# Patient Record
Sex: FEMALE | Race: WHITE | Employment: FULL TIME | URBAN - METROPOLITAN AREA
[De-identification: names, ages, dates, MRNs, and addresses within clinical notes are randomized per-mention and may not be internally consistent; named-entity substitution may affect disease eponyms.]

---

## 2018-11-05 ENCOUNTER — OFFICE VISIT (OUTPATIENT)
Dept: FAMILY MEDICINE | Facility: CLINIC | Age: 18
End: 2018-11-05
Payer: COMMERCIAL

## 2018-11-05 VITALS
BODY MASS INDEX: 42.68 KG/M2 | WEIGHT: 265.6 LBS | TEMPERATURE: 98.2 F | SYSTOLIC BLOOD PRESSURE: 106 MMHG | OXYGEN SATURATION: 97 % | HEIGHT: 66 IN | HEART RATE: 85 BPM | DIASTOLIC BLOOD PRESSURE: 76 MMHG

## 2018-11-05 DIAGNOSIS — Z23 ENCOUNTER FOR IMMUNIZATION: ICD-10-CM

## 2018-11-05 DIAGNOSIS — Z00.00 ROUTINE GENERAL MEDICAL EXAMINATION AT A HEALTH CARE FACILITY: Primary | ICD-10-CM

## 2018-11-05 DIAGNOSIS — F41.9 ANXIETY: ICD-10-CM

## 2018-11-05 DIAGNOSIS — N91.2 AMENORRHEA: ICD-10-CM

## 2018-11-05 DIAGNOSIS — E28.2 PCOS (POLYCYSTIC OVARIAN SYNDROME): ICD-10-CM

## 2018-11-05 LAB
CHOLEST SERPL-MCNC: 153.4 MG/DL (ref 0–200)
CHOLEST/HDLC SERPL: 4.1 {RATIO} (ref 0–5)
FSH SERPL-ACNC: 2 IU/L
HBA1C MFR BLD: 5.3 % (ref 4.1–5.7)
HCG UR QL: NEGATIVE
HDLC SERPL-MCNC: 37.5 MG/DL
LDLC SERPL CALC-MCNC: 76 MG/DL (ref 0–129)
LH SERPL-ACNC: 3.2 IU/L
PROLACTIN SERPL-MCNC: 11 UG/L (ref 3–27)
TRIGL SERPL-MCNC: 200.9 MG/DL (ref 0–150)
TSH SERPL DL<=0.005 MIU/L-ACNC: 1.99 MU/L (ref 0.4–4)
VLDL CHOLESTEROL: 40.2 MG/DL (ref 7–32)

## 2018-11-05 RX ORDER — LEVONORGESTREL AND ETHINYL ESTRADIOL 0.15-0.03
1 KIT ORAL DAILY
COMMUNITY
End: 2019-04-30

## 2018-11-05 RX ORDER — ERGOCALCIFEROL 1.25 MG/1
50000 CAPSULE, LIQUID FILLED ORAL
COMMUNITY
End: 2018-11-11

## 2018-11-05 RX ORDER — AZITHROMYCIN 250 MG/1
250 TABLET, FILM COATED ORAL EVERY OTHER DAY
Qty: 6 TABLET | COMMUNITY
Start: 2018-11-05

## 2018-11-05 RX ORDER — BUSPIRONE HYDROCHLORIDE 5 MG/1
10 TABLET ORAL 2 TIMES DAILY
Qty: 90 TABLET | COMMUNITY
Start: 2018-11-05 | End: 2018-11-20

## 2018-11-05 NOTE — PATIENT INSTRUCTIONS
Here is the plan from today's visit    1. Routine general medical examination at a health care facility  - HIV Antigen Antibody Combo  - Hepatitis A Antibody IgG  - Hepatitis A antibody IgM  - Hepatitis B Surface Antibody  - Hepatitis B surface antigen  - Hepatitis C antibody  - M Tuberculosis by Quantiferon  - HCG Qualitative Urine ([facility])  - NEISSERIA GONORRHOEA PCR  - CHLAMYDIA TRACHOMATIS PCR  - TSH with free T4 reflex  - Prolactin  - Testosterone Free and Total  - Follicle stimulating hormone  - Lutropin  - Lipid Cascade ([facility])  - Hemoglobin A1c ([facility])    2. Amenorrhea  - TSH with free T4 reflex  - Prolactin  - Testosterone Free and Total  - Follicle stimulating hormone  - Lutropin  - Lipid Cascade ([facility])  - Hemoglobin A1c ([facility])    Please call or return to clinic if your symptoms don't go away.    Follow up plan  Please make a clinic appointment for follow up with me (MARILOU TAVAREZ) in 2 weeks to discuss labs.      Thank you for coming to [facility] today.  Lab Testing:  **If you had lab testing today and your results are reassuring or normal they will be mailed to you or sent through [employer] within 7 days.   **If the lab tests need quick action we will call you with the results.  The phone number we will call with results is # [phone] (home) . If this is not the best number please call our clinic and change the number.  Medication Refills:  If you need any refills please call your pharmacy and they will contact us.   If you need to  your refill at a new pharmacy, please contact the new pharmacy directly. The new pharmacy will help you get your medications transferred faster.   Scheduling:  If you have any concerns about today's visit or wish to schedule another appointment please call our office during normal business hours [phone] (8-5:00 M-F)  If a referral was made to a [facility] and you don't get a call from central scheduling please  call [phone].  If a Mammogram was ordered for you at [facility] call [phone] to schedule or change your appointment.  If you had an XRay/CT/Ultrasound/MRI ordered the number is [phone] to schedule or change your radiology appointment.   Medical Concerns:  If you have urgent medical concerns please call [phone] at any time of the day.    Shila Frankel MD    Preventive Health Recommendations  Female Ages 18 to 20     Yearly exam:     See your health care provider every year in order to  o Review health changes.   o Discuss preventive care.    o Review your medicines if your doctor has prescribed any.      You should be tested each year for STDs (sexually transmitted diseases).       After age 20, talk to your provider about how often you should have cholesterol testing.      If you are at risk for diabetes, you should have a diabetes test (fasting glucose).     Shots:     Get a flu shot each year.     Get a tetanus shot every 10 years.     Consider getting the shot (vaccine) that prevents cervical cancer (Gardasil).    Nutrition:     Eat at least 5 servings of fruits and vegetables each day.    Eat whole-grain bread, whole-wheat pasta and brown rice instead of white grains and rice.    Get adequate Calcium and Vitamin D.     Lifestyle    Exercise at least 150 minutes a week each week (30 minutes a day, 5 days a week). This will help you control your weight and prevent disease.    No smoking.     Wear sunscreen to prevent skin cancer.    See your dentist every six months for an exam and cleaning.

## 2018-11-05 NOTE — PROGRESS NOTES
Preceptor Attestation:   Patient seen, evaluated and discussed with the resident. I have verified the content of the note, which accurately reflects my assessment of the patient and the plan of care.   Supervising Physician:  Luis F Lion MD

## 2018-11-05 NOTE — PROGRESS NOTES
"    Female Physical Note          HPI       Zeinab Terrazas is a 19 y/o female with a history of PTSD, anxiety/depression, unknown Ab immunodeficiency (follows at [facility]) presenting as a referral from [facility] in [place] for concern for PCOS, as well as other concerns today.    1. PCOS referral - Patient follows with [facility] in [place]. History of irregular menses. Previously was on OCPs for cramping and heavy periods (one super tampon an hour), but then taken off OCPs for approximately one year. No history of anemia. Her LMP was November 2017. She recently saw her provider at [facility] who did blood work and was found to have elevated testosterone, was started on OCPs one week ago, and referred here for further evaluation of PCOS. She does have a history of hirsutism, acne, and recent 10 lb weight gain. Patient is not currently sexually active. No history of STDs or pregnancy.     2. Anxiety/Depression/PTSD  Currently follows at [facility] for this. She does have a history of manic-like episodes, positive family history of BD. Has not been on Lithium in the past. Recently, anxiety and depression have worsened. She also notes two panic attacks in the last two months. No suicidal ideation.    3. Health maintenance  Zeinab is interested in flu shot today as well as STD screening.     Medical History:  PTSD  Anxiety/Depression  Specific Ab immunodeficiency (unknown)  Irregular menses    Previous Medical Care   [facility] in [facility]   - DURAN signed for previous medical care    Family History:   Mom - Bipolar disorder, \"thyroid issues\"  Grandpa - Bipolar disorder         Review of Systems:     Review of Systems:  CONSTITUTIONAL: NEGATIVE for fever, chills; +weight gain 10 lbs  INTEGUMENTARY/SKIN: NEGATIVE for worrisome rashes, moles or lesions  + acne  EYES: NEGATIVE for vision changes or irritation  ENT/MOUTH: NEGATIVE for ear, mouth and throat problems  RESP: NEGATIVE for significant " "cough or SOB  BREAST: NEGATIVE for masses, tenderness or discharge  CV: NEGATIVE for chest pain, palpitations or peripheral edema  GI: NEGATIVE for nausea, abdominal pain, heartburn, or change in bowel habits  : NEGATIVE for frequency, dysuria, or hematuria  MUSCULOSKELETAL: NEGATIVE for significant arthralgias or myalgia  NEURO: NEGATIVE for weakness, dizziness  ENDOCRINE: NEGATIVE for temperature intolerance; +hair growth on chin and patch on back  HEME/ALLERGY: NEGATIVE for bleeding problems, +immune problem  PSYCHIATRIC: +anxiety, depression    Sleep:   Do you snore most or the night (as reported by a family member)? No  Do you feel sleepy or extremely tired during most of the day? No           Social History     Social History: Currently at [employer]. History of meth use, alcohol use. Prior smoker. No current alcohol or illicit drug use.     Marital Status: Single    Has anyone hurt you physically, for example by pushing, hitting, slapping or kicking you or forcing you to have sex? Denies  Do you feel threatened or controlled by a partner, ex-partner or anyone in your life? Denies    Sexual Health     Sexual concerns: Yes - Would like STD testing today- only desire GC/clamydia   STI History: Neg  Pregnancy History: No previous pregnancies.  LMP: November 2017.  Last Pap Smear Date: NA  Abnormal Pap History: NA    Recommended Screening     Cholesterol Level (>44 yo or at risk):  Recommended and patient accepted testing., HIV screening, Hepatitis, GC/clamydia testing:  Recommended and patient accepted.  Hgb A1C testing recommended and accepted.         Physical Exam:     Vitals: /76  Pulse 85  Temp 98.2  F (36.8  C) (Oral)  Ht 5' 6.25\" (168.3 cm)  Wt 265 lb 9.6 oz (120.5 kg)  SpO2 97%  BMI 42.55 kg/m2  BMI= Body mass index is 42.55 kg/(m^2).   GENERAL: Well nourished, pleasant, NAD  EYES: Eyes grossly normal to inspection, extraocular movements - intact, and PERRL  HENT: ear " canals- normal; TMs- normal; Nose- normal; Mouth- no ulcers, no lesions  NECK: no tenderness, no adenopathy, no asymmetry, no masses, no stiffness; thyroid- normal to palpation  RESP: lungs clear to auscultation - no rales, no rhonchi, no wheezes  CV: regular rates and rhythm, normal S1 S2, no S3 or S4 and no murmur, no click or rub   ABDOMEN: soft, no tenderness, no  hepatosplenomegaly, no masses, normal bowel sounds  MS: extremities- no gross deformities noted, no edema  SKIN: no suspicious lesions, small patch of hair in mid back  NEURO: strength and tone- normal, sensory exam- grossly normal, mentation- intact, speech- normal, reflexes- symmetric  BACK: no CVA tenderness  - female: cervix- normal, adnexae- normal; uterus- normal, no masses, no discharge  PSYCH: Alert and oriented times 3; speech- coherent , normal rate and volume; able to articulate logical thoughts, able to abstract reason, no tangential thoughts, no hallucinations or delusions, affect- normal, no suicidal ideation  LYMPHATICS: ant. cervical- normal, post. cervical- normal    Assessment and Plan     Zeinab Terrazas is a 17 y/o female with a history of PTSD, anxiety/depression, specific Ab immunodeficiency (follows at [facility]) presenting as a referral from [facility] [place] for concern for PCOS, as well as health maintenance exam.    # Amenorrhea  # PCOS  Based on history and exam consistent with hirsutism and amenorrhea with history of elevated testosterone, patient clinically meets diagnosis for PCOS. We will recheck labs here and do full amenorrhea workup. She is already on OCPs. Follow up in 2 weeks to discuss lab results. Depending on what the labs show, we will consider obtaining pelvic US and in the future adding metformin if no improvement with OCP's in 6 months. If symptoms of hirsutism and acne are bothersome, could also consider adding spironolactone.   - Continue OCP's   - Recommended weight loss with daily  exercise and healthy eating habits    Labs  -     TSH with free T4 reflex  -     Prolactin  -     Testosterone Free and Total  -     Follicle stimulating hormone  -     Lutropin  -     Lipid Cascade ([facility])  -     Hemoglobin A1c ([facility])    # Anxiety  # Depression  # PTSD  Follows with [facility] and has appt tomorrow.   - Follow up with [facility] for medication management    # Routine general medical examination at a health care facility  Concern for STDs today.  Pelvic exam normal, no tenderness or masses.   -     NEISSERIA GONORRHOEA PCR  -     CHLAMYDIA TRACHOMATIS PCR  -     HIV Antigen Antibody Combo  -     Hepatitis A Antibody IgG  -     Hepatitis A antibody IgM  -     Hepatitis B Surface Antibody  -     Hepatitis B surface antigen  -     Hepatitis C antibody  -     M Tuberculosis by Quantiferon  -     HCG Qualitative Urine ([facility])    # Encounter for immunization  -     ADMIN VACCINE, INITIAL  -     FLU VAC PRESRV FREE QUAD SPLIT VIR IM, 0.5 mL dosage    Follow up in 2 weeks to go over laboratory testing.    Options for treatment and follow-up care were reviewed with the patient . Zeinab Terrazas and/or guardian engaged in the decision making process and verbalized understanding of the options discussed and agreed with the final plan.    Patient seen and examined with Dr. Frankel.  Soco Brennan, MS4    I was present with the medical student who participated in the service and in the documentation of this note. I have verified the history and personally performed the physical exam and medical decision making, and have verified the content of the note, which accurately reflects my assessment of the patient and the plan of care.   Shila Frankel, DO

## 2018-11-05 NOTE — LETTER
November 8, 2018      Zeinab Terrazas  [address] [place] [address]        Dear Zeinab,    Thank you for getting your care at [facility]. Please see below for your test results. Your results are normal except a reactive hepatitis A antibody IgG and negative IgM antibody that shows a previous hepatitis A infection with no current infection. Your triglycerides were slightly elevated and your HDL was slightly low. Please follow up in 2 weeks to discuss further.     Resulted Orders   HIV Antigen Antibody Combo   Result Value Ref Range    HIV Antigen Antibody Combo Nonreactive NR^Nonreactive          Comment:      HIV-1 p24 Ag & HIV-1/HIV-2 Ab Not Detected   Hepatitis A Antibody IgG   Result Value Ref Range    Hepatitis A Antibody IgG Reactive (AA) NR^Nonreactive      Comment:      This assay cannot be used for the diagnosis of acute HAV infection.   Hepatitis A antibody IgM   Result Value Ref Range    Hepatitis A IgM [place] Nonreactive NR^Nonreactive      Comment:      IgM anti-HAV not detected. Does not exclude the possibility of recent exposure   to or infection with HAV.     Hepatitis B Surface Antibody   Result Value Ref Range    Hepatitis B Surface Antibody 5.12 <8.00 m[IU]/mL      Comment:      Nonreactive, No antibody detected when the value is less than 8.00 m[IU]/mL.   Hepatitis B surface antigen   Result Value Ref Range    Hep B Surface Agn Nonreactive NR^Nonreactive   Hepatitis C antibody   Result Value Ref Range    Hepatitis C Antibody Nonreactive NR^Nonreactive      Comment:      Assay performance characteristics have not been established for newborns,   infants, and children     HCG Qualitative Urine (Lab[facility])   Result Value Ref Range    HCG Qual Urine NEGATIVE Negative   NEISSERIA GONORRHOEA PCR   Result Value Ref Range    Specimen Descrip Cervical     N Gonorrhea PCR Negative NEG^Negative      Comment:      Negative for N. gonorrhoeae rRNA by transcription mediated amplification.  A negative  result by transcription mediated amplification does not preclude   the presence of N. gonorrhoeae infection because results are dependent on   proper and adequate collection, absence of inhibitors, and sufficient rRNA to   be detected.     CHLAMYDIA TRACHOMATIS PCR   Result Value Ref Range    Specimen Description Cervical     Chlamydia Trachomatis PCR Negative NEG^Negative      Comment:      Negative for C. trachomatis rRNA by transcription mediated amplification.  A negative result by transcription mediated amplification does not preclude   the presence of C. trachomatis infection because results are dependent on   proper and adequate collection, absence of inhibitors, and sufficient rRNA to   be detected.     TSH with free T4 reflex   Result Value Ref Range    TSH 1.99 0.40 - 4.00 mU/L   Prolactin   Result Value Ref Range    Prolactin 11 3 - 27 ug/L      Comment:      Reference ranges apply to non-pregnant females only.   Testosterone Free and Total   Result Value Ref Range    Testosterone Total 51 8 - 60 ng/dL      Comment:      This test was developed and its performance characteristics determined by the   [facility],  [facility]. It has   not been cleared or approved by the FDA. The laboratory is regulated under   CLIA as qualified to perform high-complexity testing. This test is used for   clinical purposes. It should not be regarded as investigational or for   research.      Sex Hormone Binding Globulin 64 30 - 135 nmol/L    Free Testosterone Calculated 0.58 0.08 - 0.74 ng/dL      Comment:      18-30 Years 0.08-0.74 ng/dL  31-40 Years 0.13-0.92 ng/dL  41-51 Years 0.11-0.58 ng/dL  Postmenopausal 0.06-0.38 ng/dL     Follicle stimulating hormone   Result Value Ref Range    FSH 2.0 IU/L      Comment:      FSH Reference Range  Female: Follicular      2.5-10.2          Mid-cycle       3.4-33.4          Luteal          1.5-9.1          Postmenopausal  23.0-116.3      Lutropin   Result Value Ref Range    Lutropin 3.2 IU/L      Comment:      LH Reference Range  Female: Follicular      1.9-12.5          Mid-cycle       8.7-76.3          Luteal          0.5-16.9          Postmenopausal  15.9-54.0     Lipid Cascade ([facility])   Result Value Ref Range    Cholesterol 153.4 0.0 - 200.0 mg/dL    Cholesterol/HDL Ratio 4.1 0.0 - 5.0    HDL Cholesterol 37.5 (L) >40.0 mg/dL    LDL Cholesterol Calculated 76 0 - 129 mg/dL    Triglycerides 200.9 (H) 0.0 - 150.0 mg/dL    VLDL Cholesterol 40.2 (H) 7.0 - 32.0 mg/dL   Hemoglobin A1c ([facility])   Result Value Ref Range    Hemoglobin A1C 5.3 4.1 - 5.7 %   Quantiferon TB Gold Plus   Result Value Ref Range    Quantiferon-TB Gold Plus Result Negative NEG^Negative      Comment:      No interferon gamma response to M.tuberculosis antigens was detected.   Infection with M.tuberculosis is unlikely, however a single negative result   does not exclude infection. In patients at high risk for infection, a second   test should be considered  in accordance with the 2017 ATS/IDSA/CDC Clinical Practice Guidelines for   Diagnosis of Tuberculosis in Adults and Children [Lewinsohn DM et   al.Clin.Infect.Dis. 2017 64(2):111-115].      TB1 Ag minus Nil Value 0.00 IU/mL    TB2 Ag minus Nil Value 0.00 IU/mL    Mitogen minus Nil Result >10.00 IU/mL    Nil Result 0.01 IU/mL       As we discussed at our last visit, please follow up with a clinic appointment to review these results further.    Sincerely,    Shila Frankel MD

## 2018-11-05 NOTE — MR AVS SNAPSHOT
After Visit Summary   11/5/2018    Zeinab Terrazas    MRN: [id number]           Patient Information     Date Of Birth          2000        Visit Information        Provider Department      11/5/2018 1:20 PM Marilou Tavarez MD [facility]        Today's Diagnoses     Routine general medical examination at a health care facility    -  1    Amenorrhea          Care Instructions    Here is the plan from today's visit    1. Routine general medical examination at a health care facility  - HIV Antigen Antibody Combo  - Hepatitis A Antibody IgG  - Hepatitis A antibody IgM  - Hepatitis B Surface Antibody  - Hepatitis B surface antigen  - Hepatitis C antibody  - M Tuberculosis by Quantiferon  - HCG Qualitative Urine (LabDAQ)  - NEISSERIA GONORRHOEA PCR  - CHLAMYDIA TRACHOMATIS PCR  - TSH with free T4 reflex  - Prolactin  - Testosterone Free and Total  - Follicle stimulating hormone  - Lutropin  - Lipid Cascade ([facility])  - Hemoglobin A1c ([facility])    2. Amenorrhea  - TSH with free T4 reflex  - Prolactin  - Testosterone Free and Total  - Follicle stimulating hormone  - Lutropin  - Lipid Cascade ([facility])  - Hemoglobin A1c ([facility])    Please call or return to clinic if your symptoms don't go away.    Follow up plan  Please make a clinic appointment for follow up with me (MARILOU TAVAREZ) in 2 weeks to discuss labs.      Thank you for coming to [facility] today.  Lab Testing:  **If you had lab testing today and your results are reassuring or normal they will be mailed to you or sent through [employer] within 7 days.   **If the lab tests need quick action we will call you with the results.  The phone number we will call with results is # [phone] (home) . If this is not the best number please call our clinic and change the number.  Medication Refills:  If you need any refills please call your pharmacy and they will contact us.   If you need to  your refill at a  new pharmacy, please contact the new pharmacy directly. The new pharmacy will help you get your medications transferred faster.   Scheduling:  If you have any concerns about today's visit or wish to schedule another appointment please call our office during normal business hours [phone] (8-5:00 M-F)  If a referral was made to a [facility] and you don't get a call from central scheduling please call [phone].  If a Mammogram was ordered for you at [facility] call [phone] to schedule or change your appointment.  If you had an XRay/CT/Ultrasound/MRI ordered the number is [phone] to schedule or change your radiology appointment.   Medical Concerns:  If you have urgent medical concerns please call [phone] at any time of the day.    Shila Frankel MD    Preventive Health Recommendations  Female Ages 18 to 20     Yearly exam:     See your health care provider every year in order to  o Review health changes.   o Discuss preventive care.    o Review your medicines if your doctor has prescribed any.      You should be tested each year for STDs (sexually transmitted diseases).       After age 20, talk to your provider about how often you should have cholesterol testing.      If you are at risk for diabetes, you should have a diabetes test (fasting glucose).     Shots:     Get a flu shot each year.     Get a tetanus shot every 10 years.     Consider getting the shot (vaccine) that prevents cervical cancer (Gardasil).    Nutrition:     Eat at least 5 servings of fruits and vegetables each day.    Eat whole-grain bread, whole-wheat pasta and brown rice instead of white grains and rice.    Get adequate Calcium and Vitamin D.     Lifestyle    Exercise at least 150 minutes a week each week (30 minutes a day, 5 days a week). This will help you control your weight and prevent disease.    No smoking.     Wear sunscreen to prevent skin cancer.    See your dentist every six months  for an exam and cleaning.                Follow-ups after your visit        Who to contact     Please call your clinic at [phone] to:    Ask questions about your health    Make or cancel appointments    Discuss your medicines    Learn about your test results    Speak to your doctor            Additional Information About Your Visit        Care EveryWhere ID     This is your Care EveryWhere ID. This could be used by other organizations to access your [facility] medical records  [id number]        Your Vitals Were     Pulse Temperature Pulse Oximetry             85 98.2  F (36.8  C) (Oral) 97%          Blood Pressure from Last 3 Encounters:   11/05/18 106/76    Weight from Last 3 Encounters:   11/05/18 265 lb 9.6 oz (120.5 kg)              We Performed the Following     CHLAMYDIA TRACHOMATIS PCR     Follicle stimulating hormone     HCG Qualitative Urine (LabDAQ)     Hemoglobin A1c ([facility])     Hepatitis A Antibody IgG     Hepatitis A antibody IgM     Hepatitis B Surface Antibody     Hepatitis B surface antigen     Hepatitis C antibody     HIV Antigen Antibody Combo     Lipid Cascade ([facility])     Lutropin     M Tuberculosis by Quantiferon     NEISSERIA GONORRHOEA PCR     Prolactin     Testosterone Free and Total     TSH with free T4 reflex          Today's Medication Changes          These changes are accurate as of 11/5/18  2:37 PM.  If you have any questions, ask your nurse or doctor.               These medicines have changed or have updated prescriptions.        Dose/Directions    azithromycin 250 MG tablet   Commonly known as:  ZITHROMAX   This may have changed:    - medication strength  - when to take this   Changed by:  Shila Frankel MD        Dose:  250 mg   Take 1 tablet (250 mg) by mouth every other day   Quantity:  6 tablet   Refills:  0       busPIRone 5 MG tablet   Commonly known as:  BUSPAR   This may have changed:  when to take this   Changed by:  Shila Frankel MD        Dose:  10  mg   Take 2 tablets (10 mg) by mouth 2 times daily   Quantity:  90 tablet   Refills:  0                Primary Care Provider Fax #    Physician No Ref-Primary [phone]       No address on file        Equal Access to Services     DAYOCRYSTAL BRENDA : Hadshey pili leo pete Shin, leisa turner, qahelenata kajolene lockwood, gwen coellomono zenia. So [facility] [phone].    ATENCIÓN: Si habla español, tiene a graham disposición servicios gratuitos de asistencia lingüística. Llame al [phone].    We comply with applicable federal civil rights laws and [place] laws. We do not discriminate on the basis of race, color, national origin, age, disability, sex, sexual orientation, or gender identity.            Thank you!     Thank you for choosing [facility]  for your care. Our goal is always to provide you with excellent care. Hearing back from our patients is one way we can continue to improve our services. Please take a few minutes to complete the written survey that you may receive in the mail after your visit with us. Thank you!             Your Updated Medication List - Protect others around you: Learn how to safely use, store and throw away your medicines at www.disposemymeds.org.          This list is accurate as of 11/5/18  2:37 PM.  Always use your most recent med list.                   Brand Name Dispense Instructions for use Diagnosis    ARIPIPRAZOLE PO      Take 2 mg by mouth daily        azithromycin 250 MG tablet    ZITHROMAX    6 tablet    Take 1 tablet (250 mg) by mouth every other day        busPIRone 5 MG tablet    BUSPAR    90 tablet    Take 2 tablets (10 mg) by mouth 2 times daily        IBUPROFEN PO      Take 800 mg by mouth        levonorgestrel-ethinyl estradiol 0.15-30 MG-MCG per tablet    NORDETTE     Take 1 tablet by mouth daily        * QUETIAPINE FUMARATE PO      Take 100 mg by mouth        * QUETIAPINE FUMARATE PO      Take 50 mg by mouth        vitamin D2  99573 UNIT capsule    ERGOCALCIFEROL     Take 50,000 Units by mouth        * Notice:  This list has 2 medication(s) that are the same as other medications prescribed for you. Read the directions carefully, and ask your doctor or other care provider to review them with you.

## 2018-11-06 LAB
C TRACH DNA SPEC QL NAA+PROBE: NEGATIVE
HAV IGG SER QL IA: REACTIVE
HAV IGM SERPL QL IA: NONREACTIVE
HBV SURFACE AB SERPL IA-ACNC: 5.12 M[IU]/ML
HBV SURFACE AG SERPL QL IA: NONREACTIVE
HCV AB SERPL QL IA: NONREACTIVE
HIV 1+2 AB+HIV1 P24 AG SERPL QL IA: NONREACTIVE
N GONORRHOEA DNA SPEC QL NAA+PROBE: NEGATIVE
SPECIMEN SOURCE: NORMAL
SPECIMEN SOURCE: NORMAL

## 2018-11-07 LAB
GAMMA INTERFERON BACKGROUND BLD IA-ACNC: 0.01 IU/ML
M TB IFN-G BLD-IMP: NEGATIVE
M TB IFN-G CD4+ BCKGRND COR BLD-ACNC: >10 IU/ML
MITOGEN IGNF BCKGRD COR BLD-ACNC: 0 IU/ML
MITOGEN IGNF BCKGRD COR BLD-ACNC: 0 IU/ML

## 2018-11-08 LAB
SHBG SERPL-SCNC: 64 NMOL/L (ref 30–135)
TESTOST FREE SERPL-MCNC: 0.58 NG/DL (ref 0.08–0.74)
TESTOST SERPL-MCNC: 51 NG/DL (ref 8–60)

## 2018-11-11 PROBLEM — F33.1 MODERATE EPISODE OF RECURRENT MAJOR DEPRESSIVE DISORDER (H): Status: ACTIVE | Noted: 2018-11-11

## 2018-11-11 PROBLEM — F41.9 ANXIETY: Status: ACTIVE | Noted: 2018-11-11

## 2018-11-11 PROBLEM — E28.2 PCOS (POLYCYSTIC OVARIAN SYNDROME): Status: ACTIVE | Noted: 2018-11-11

## 2018-11-11 PROBLEM — F19.10 POLYSUBSTANCE ABUSE (H): Status: ACTIVE | Noted: 2018-11-11

## 2018-11-11 RX ORDER — ERGOCALCIFEROL 1.25 MG/1
50000 CAPSULE, LIQUID FILLED ORAL WEEKLY
Qty: 30 CAPSULE | COMMUNITY
Start: 2018-11-11 | End: 2018-12-10

## 2018-11-20 ENCOUNTER — OFFICE VISIT (OUTPATIENT)
Dept: FAMILY MEDICINE | Facility: CLINIC | Age: 18
End: 2018-11-20
Payer: COMMERCIAL

## 2018-11-20 VITALS
DIASTOLIC BLOOD PRESSURE: 84 MMHG | OXYGEN SATURATION: 100 % | TEMPERATURE: 98.9 F | HEART RATE: 71 BPM | BODY MASS INDEX: 42.93 KG/M2 | WEIGHT: 268 LBS | SYSTOLIC BLOOD PRESSURE: 135 MMHG

## 2018-11-20 DIAGNOSIS — F33.1 MODERATE EPISODE OF RECURRENT MAJOR DEPRESSIVE DISORDER (H): Primary | ICD-10-CM

## 2018-11-20 DIAGNOSIS — N92.2 EXCESSIVE MENSTRUATION AT PUBERTY: ICD-10-CM

## 2018-11-20 LAB — HEMOGLOBIN: 14.2 G/DL (ref 11.7–15.7)

## 2018-11-20 RX ORDER — BUSPIRONE HYDROCHLORIDE 15 MG/1
15 TABLET ORAL 2 TIMES DAILY
Qty: 60 TABLET | Refills: 0 | Status: SHIPPED | OUTPATIENT
Start: 2018-11-20 | End: 2018-12-27

## 2018-11-20 RX ORDER — ARIPIPRAZOLE 5 MG/1
5 TABLET ORAL AT BEDTIME
Qty: 30 TABLET | Refills: 0 | Status: SHIPPED | OUTPATIENT
Start: 2018-11-20 | End: 2018-12-10

## 2018-11-20 ASSESSMENT — ENCOUNTER SYMPTOMS
APPETITE CHANGE: 0
ABDOMINAL PAIN: 0
DYSPHORIC MOOD: 1
FATIGUE: 1
HALLUCINATIONS: 0
DECREASED CONCENTRATION: 1
LIGHT-HEADEDNESS: 0
RESPIRATORY NEGATIVE: 1
ACTIVITY CHANGE: 0
SLEEP DISTURBANCE: 1
NERVOUS/ANXIOUS: 1
WEAKNESS: 0
NAUSEA: 0
DIARRHEA: 0
DYSURIA: 0
POLYPHAGIA: 0
VOMITING: 0
PALPITATIONS: 0
POLYDIPSIA: 0
DIZZINESS: 0
UNEXPECTED WEIGHT CHANGE: 0
DIFFICULTY URINATING: 0
ABDOMINAL DISTENTION: 0
HYPERACTIVE: 0

## 2018-11-20 NOTE — LETTER
November 20, 2018      Zeinab Terrazas  [address] [facility] [address]        Dear Zeinab,    Thank you for getting your care at [facility]. Please see below for your test results. Your hemoglobin returned normal.     Resulted Orders   Hemoglobin (HGB) ([facility])   Result Value Ref Range    Hemoglobin 14.2 11.7 - 15.7 g/dL         Sincerely,    Kelsey Rodriguez MD

## 2018-11-20 NOTE — PROGRESS NOTES
Preceptor Attestation:   Patient seen, evaluated and discussed with the resident. I have verified the content of the note, which accurately reflects my assessment of the patient and the plan of care.   Supervising Physician:  Hedy Salazar MD

## 2018-11-20 NOTE — MR AVS SNAPSHOT
After Visit Summary   11/20/2018    Zeinab Terrazas    MRN: [id number]           Patient Information     Date Of Birth          2000        Visit Information        Provider Department      11/20/2018 1:00 PM Pam Rodriguez MD \[facility]        Today's Diagnoses     Moderate episode of recurrent major depressive disorder (H)    -  1    Excessive menstruation at puberty          Care Instructions    We adjusted your medications today.   Increase Abilify 5mg daily.   Increase your buspar to 15mg twice a day.  Ok to hold the morning seroquel dose if you makes you drowsy.   I will refer you to Behavioral Health at [facility] clinic with Dr. Arroyo    We will check your hemoglobin because of your bleeding.           Follow-ups after your visit        Who to contact     Please call your clinic at [phone] to:    Ask questions about your health    Make or cancel appointments    Discuss your medicines    Learn about your test results    Speak to your doctor            Additional Information About Your Visit        Care EveryWhere ID     This is your Care EveryWhere ID. This could be used by other organizations to access your [facility] medical records  [id number]        Your Vitals Were     Pulse Temperature Pulse Oximetry BMI (Body Mass Index)          71 98.9  F (37.2  C) (Oral) 100% 42.93 kg/m2         Blood Pressure from Last 3 Encounters:   11/20/18 135/84   11/05/18 106/76    Weight from Last 3 Encounters:   11/20/18 268 lb (121.6 kg)   11/05/18 265 lb 9.6 oz (120.5 kg)              We Performed the Following     Hemoglobin (HGB) ([facility])          Today's Medication Changes          These changes are accurate as of 11/20/18  1:58 PM.  If you have any questions, ask your nurse or doctor.               These medicines have changed or have updated prescriptions.        Dose/Directions    * ARIPIPRAZOLE PO   This may have changed:  Another medication with the same name was  added. Make sure you understand how and when to take each.   Changed by:  Pam Rodriguez MD        Dose:  2 mg   Take 2 mg by mouth daily   Refills:  0       * ARIPiprazole 5 MG tablet   Commonly known as:  ABILIFY   This may have changed:  You were already taking a medication with the same name, and this prescription was added. Make sure you understand how and when to take each.   Used for:  Moderate episode of recurrent major depressive disorder (H)   Changed by:  Pam Rodriguez MD        Dose:  5 mg   Take 1 tablet (5 mg) by mouth At Bedtime   Quantity:  30 tablet   Refills:  0       busPIRone 15 MG tablet   Commonly known as:  BUSPAR   This may have changed:    - medication strength  - how much to take   Used for:  Moderate episode of recurrent major depressive disorder (H)   Changed by:  Pam Rodriguez MD        Dose:  15 mg   Take 1 tablet (15 mg) by mouth 2 times daily   Quantity:  60 tablet   Refills:  0       * Notice:  This list has 2 medication(s) that are the same as other medications prescribed for you. Read the directions carefully, and ask your doctor or other care provider to review them with you.         Where to get your medicines      These medications were sent to [facility] [place] [facility] - [place], [place] - [address], [address], [address], [place] [address]     Phone:  [phone]     ARIPiprazole 5 MG tablet    busPIRone 15 MG tablet                Primary Care Provider Fax #    Physician No Ref-Primary [phone]       No address on file        Equal Access to Services     CHARLINE GUTIERREZ AH: Hadii pili freeman Soulises, waaxda luqadaha, qaybta kaalmada adeegyada, gwen knutson. So [facility] [phone].    ATENCIÓN: Si habla español, tiene a graham disposición servicios gratuitos de asistencia lingüística. Llame al [phone].    We comply with applicable federal civil rights laws and [place] laws. We do not discriminate on the basis of  race, color, national origin, age, disability, sex, sexual orientation, or gender identity.            Thank you!     Thank you for choosing [facility]  for your care. Our goal is always to provide you with excellent care. Hearing back from our patients is one way we can continue to improve our services. Please take a few minutes to complete the written survey that you may receive in the mail after your visit with us. Thank you!             Your Updated Medication List - Protect others around you: Learn how to safely use, store and throw away your medicines at www.disposemymeds.org.          This list is accurate as of 11/20/18  1:58 PM.  Always use your most recent med list.                   Brand Name Dispense Instructions for use Diagnosis    * ARIPIPRAZOLE PO      Take 2 mg by mouth daily        * ARIPiprazole 5 MG tablet    ABILIFY    30 tablet    Take 1 tablet (5 mg) by mouth At Bedtime    Moderate episode of recurrent major depressive disorder (H)       azithromycin 250 MG tablet    ZITHROMAX    6 tablet    Take 1 tablet (250 mg) by mouth every other day        busPIRone 15 MG tablet    BUSPAR    60 tablet    Take 1 tablet (15 mg) by mouth 2 times daily    Moderate episode of recurrent major depressive disorder (H)       IBUPROFEN PO      Take 800 mg by mouth        levonorgestrel-ethinyl estradiol 0.15-30 MG-MCG per tablet    NORDETTE     Take 1 tablet by mouth daily        * QUETIAPINE FUMARATE PO      Take 100 mg by mouth        * QUETIAPINE FUMARATE PO      Take 50 mg by mouth        vitamin D2 30315 UNIT capsule    ERGOCALCIFEROL    30 capsule    Take 1 capsule (50,000 Units) by mouth once a week        * Notice:  This list has 4 medication(s) that are the same as other medications prescribed for you. Read the directions carefully, and ask your doctor or other care provider to review them with you.

## 2018-11-20 NOTE — PROGRESS NOTES
HPI       Zeinab Terrazas is a 18 year old  who presents for   Chief Complaint   Patient presents with     RECHECK     would like to not have seraquil in the am and would like some pain medication for cramps     1) Medication adjustments- Recently was started on Aripiprazole, Buspar, and Seroquel by a nurse practitioner at [facility] in [facility] 2 months ago. Was taking Seroquel 50 mg AM, 50mg afternoon, and 100mg PM however over the last couple of weeks stopped taking AM dose as it has been making her sleepy throughout most of the day. Since that time, has noticed increased anxiety and depression (worst it has ever been). Has little interest in doing things, sleeping more, difficulty concentrating, feels hopeless, wishes to be alone. Denies suicidal ideation. She has been in [facility] for 3 months and feels as though she will never make any progress. Recently started seeing therapist at program but has only had 2 sessions. She has previously tried Hydroxyzine and Lexapro for her anxiety and depression in the past but stopped these due to fatigue. Denies history of Schizophrenia or Bipolar disorder however Mom and sibling have BD. Reports hallucinations last summer due to drug overdose. No additional hallucinations since.     2) Dysmenorrhea- has history of PCOS and was recently started on OCPs 1 month ago to help with amenorrhea. Since starting OCPs, she has had constant pelvic cramping daily despite 800mg Ibuprofen, taken one time in a day. LMP started yesterday and has had significant bleeding, soaking through super tampon every 1.5hrs over the last two days. Denies any lightheadedness, dizziness, heart racing, syncope.     Problem, Medication and Allergy Lists were reviewed and updated if needed..    Patient is an established patient of this clinic..         Review of Systems:   Review of Systems   Constitutional: Positive for fatigue. Negative for activity change, appetite change and unexpected  weight change.   HENT: Negative.    Respiratory: Negative.    Cardiovascular: Negative for chest pain and palpitations.   Gastrointestinal: Negative for abdominal distention, abdominal pain, diarrhea, nausea and vomiting.   Endocrine: Negative for polydipsia, polyphagia and polyuria.   Genitourinary: Positive for menstrual problem and pelvic pain. Negative for difficulty urinating, dyspareunia, dysuria, genital sores, vaginal discharge and vaginal pain.   Neurological: Negative for dizziness, syncope, weakness and light-headedness.   Psychiatric/Behavioral: Positive for behavioral problems, decreased concentration, dysphoric mood and sleep disturbance. Negative for hallucinations, self-injury and suicidal ideas. The patient is nervous/anxious. The patient is not hyperactive.           Physical Exam:     Vitals:    11/20/18 1304   BP: 135/84   Pulse: 71   Temp: 98.9  F (37.2  C)   TempSrc: Oral   SpO2: 100%   Weight: 268 lb (121.6 kg)     Body mass index is 42.93 kg/(m^2).  Vitals were reviewed and were normal except for BMI     Physical Exam   Constitutional: She is oriented to person, place, and time. She appears well-developed and well-nourished. No distress.   HENT:   Mouth/Throat: Oropharynx is clear and moist.   Neck: No thyromegaly present.   Cardiovascular: Normal rate, regular rhythm and normal heart sounds.    No murmur heard.  Pulmonary/Chest: Breath sounds normal. She has no wheezes.   Abdominal: Soft. She exhibits no distension and no mass. There is no tenderness.   Lymphadenopathy:     She has no cervical adenopathy.   Neurological: She is alert and oriented to person, place, and time.   Psychiatric:   Depressed affect.        Results:      Results from this visit  Results for orders placed or performed in visit on 11/20/18   Hemoglobin (HGB) ([facility])   Result Value Ref Range    Hemoglobin 14.2 11.7 - 15.7 g/dL     GAD7- 16  PHQ9-14  Assessment and Plan      1. Moderate episode of recurrent major  depressive disorder (H)- started on Aripiprazole (2mg), Buspar (10mg), and Seroquel (200mg daily) at [facility] in [facility] 2 months ago. Noted decreased anxiety and depression shortly after starting her medications however has not been taking 50mg AM dose of Seroquel over the past couple of weeks due to increased sleepiness and notes anxiety and depression now at their worst- anxious, sleeping more, anhedonia, socially isolated, hopeless, difficulty concentrating x2 weeks meeting criteria for recurrent major depressive episode. Is not suicidal and has therapist and friends at [employer] who she can talk to. Will plan to increase Aripiprazole to 5mg daily and Buspar to 15mg BID. I am okay with her holding her morning dose of Seroquel for now but would like her to follow up with Psychiatry (Dr Arroyo) at [facility] in 1-2 weeks.   - ARIPiprazole (ABILIFY) 5 MG tablet; Take 1 tablet (5 mg) by mouth At Bedtime  Dispense: 30 tablet; Refill: 0  - busPIRone (BUSPAR) 15 MG tablet; Take 1 tablet (15 mg) by mouth 2 times daily  Dispense: 60 tablet; Refill: 0  - BEHAVIORAL HEALTH REFERRAL ([facility])    2. Excessive menstruation- has history of PCOS with previous amenorrhea for which she was started on OCPs 1 month ago. Just finished her first pack and notes daily menstrual cramping since starting them as well as significant menstrual bleeding over the last two days requiring change in super tampon every 1.5hrs. Has not had any dizziness or lightheadedness with this but will check a Hgb level today given her significant flow. Recommend taking 800mg Ibuprofen TID for 5-7 days, which should help with her pain and to decrease her bleeding as well. If continues to have bleeding for the next week, will consider rechecking Hgb. Follow up in 1-2 weeks.   - Hemoglobin (HGB) ([facility])  - Scheduled Ibuprofen 800mg TID for 5-7 days     Medications Discontinued During This Encounter   Medication Reason     busPIRone  (BUSPAR) 5 MG tablet Reorder       Options for treatment and follow-up care were reviewed with the patient. Zeinab Terrazas  engaged in the decision making process and verbalized understanding of the options discussed and agreed with the final plan.      Tom Huang, MS3  [facility]     I was present with the medical student who participated in the service and in the documentation of this note. I have verified the history and personally performed the physical exam and medical decision making, and have verified the content of the note, which accurately reflects my assessment of the patient and the plan of care.  Kelsey Rodriguez MD  Family Medicine PGY3 Resident

## 2018-11-20 NOTE — PATIENT INSTRUCTIONS
We adjusted your medications today.   Increase Abilify 5mg daily.   Increase your buspar to 15mg twice a day.  Ok to hold the morning seroquel dose if you makes you drowsy.   I will refer you to Behavioral Health at [place] clinic with Dr. Arroyo    We will check your hemoglobin because of your bleeding.

## 2018-11-27 ENCOUNTER — HEALTH MAINTENANCE LETTER (OUTPATIENT)
Age: 18
End: 2018-11-27

## 2018-12-10 ENCOUNTER — OFFICE VISIT (OUTPATIENT)
Dept: FAMILY MEDICINE | Facility: CLINIC | Age: 18
End: 2018-12-10
Payer: COMMERCIAL

## 2018-12-10 VITALS
HEART RATE: 83 BPM | OXYGEN SATURATION: 99 % | WEIGHT: 269.4 LBS | BODY MASS INDEX: 43.15 KG/M2 | DIASTOLIC BLOOD PRESSURE: 79 MMHG | TEMPERATURE: 99 F | SYSTOLIC BLOOD PRESSURE: 124 MMHG

## 2018-12-10 DIAGNOSIS — E28.2 PCOS (POLYCYSTIC OVARIAN SYNDROME): Primary | ICD-10-CM

## 2018-12-10 DIAGNOSIS — F33.1 MODERATE EPISODE OF RECURRENT MAJOR DEPRESSIVE DISORDER (H): ICD-10-CM

## 2018-12-10 RX ORDER — ARIPIPRAZOLE 5 MG/1
5 TABLET ORAL AT BEDTIME
Qty: 90 TABLET | Refills: 3 | COMMUNITY
Start: 2018-12-10

## 2018-12-10 ASSESSMENT — ANXIETY QUESTIONNAIRES
IF YOU CHECKED OFF ANY PROBLEMS ON THIS QUESTIONNAIRE, HOW DIFFICULT HAVE THESE PROBLEMS MADE IT FOR YOU TO DO YOUR WORK, TAKE CARE OF THINGS AT HOME, OR GET ALONG WITH OTHER PEOPLE: VERY DIFFICULT
1. FEELING NERVOUS, ANXIOUS, OR ON EDGE: NEARLY EVERY DAY
2. NOT BEING ABLE TO STOP OR CONTROL WORRYING: NEARLY EVERY DAY
5. BEING SO RESTLESS THAT IT IS HARD TO SIT STILL: MORE THAN HALF THE DAYS
3. WORRYING TOO MUCH ABOUT DIFFERENT THINGS: NEARLY EVERY DAY
7. FEELING AFRAID AS IF SOMETHING AWFUL MIGHT HAPPEN: NOT AT ALL
GAD7 TOTAL SCORE: 15
6. BECOMING EASILY ANNOYED OR IRRITABLE: MORE THAN HALF THE DAYS

## 2018-12-10 ASSESSMENT — ENCOUNTER SYMPTOMS
HEADACHES: 0
FEVER: 0
SLEEP DISTURBANCE: 0
SHORTNESS OF BREATH: 0
DYSPHORIC MOOD: 1

## 2018-12-10 ASSESSMENT — PATIENT HEALTH QUESTIONNAIRE - PHQ9
SUM OF ALL RESPONSES TO PHQ QUESTIONS 1-9: 17
5. POOR APPETITE OR OVEREATING: MORE THAN HALF THE DAYS

## 2018-12-10 NOTE — PROGRESS NOTES
Preceptor Attestation:   Patient seen and discussed with the resident. Assessment and plan reviewed with resident and agreed upon.   Supervising Physician:  [facility]

## 2018-12-10 NOTE — PATIENT INSTRUCTIONS
Here is the plan from today's visit    1. PCOS (polycystic ovarian syndrome)  - continue taking birth control pills dialy    2. Moderate episode of recurrent major depressive disorder (H)  - Follow up with psychiatry at [facility] this week     Please call or return to clinic if your symptoms don't go away.    Follow up plan  Please make a clinic appointment for follow up with me (MARILOU QUESADA) in 3 months.   Thank you for coming to [facility] today.  Lab Testing:  **If you had lab testing today and your results are reassuring or normal they will be mailed to you or sent through [employer] within 7 days.   **If the lab tests need quick action we will call you with the results.  The phone number we will call with results is # [phone] (home) . If this is not the best number please call our clinic and change the number.  Medication Refills:  If you need any refills please call your pharmacy and they will contact us.   If you need to  your refill at a new pharmacy, please contact the new pharmacy directly. The new pharmacy will help you get your medications transferred faster.   Scheduling:  If you have any concerns about today's visit or wish to schedule another appointment please call our office during normal business hours [phone] (8-5:00 M-F)  If a referral was made to a [facility] and you don't get a call from central scheduling please call [phone].  If a Mammogram was ordered for you at [facility] call [phone] to schedule or change your appointment.  If you had an XRay/CT/Ultrasound/MRI ordered the number is [phone] to schedule or change your radiology appointment.   Medical Concerns:  If you have urgent medical concerns please call [phone] at any time of the day.    Marilou Quesada MD

## 2018-12-10 NOTE — PROGRESS NOTES
HPI       Zeinab Terrazas is a 18 year old  who presents from [facility] for follow up on her medications.    Zeinab was seen on 11/20/18 at [facility] during which appointment her depression medication doses were changed due to excessive daytime sleepiness. Since that time she reports no change in her mood. She still feels depressed and reports neither improvement nor worsening of her mood. She denies SI and thoughts of harming others. Notably she does report two instances of fleeting thoughts of self-harm since her last clinic appointment. They arose without a specific provoking circumstance, did not last long and went away shortly. She did not act on these feelings. She says that she used to have thoughts of self harm years ago but they had ceased until these two recent episodes. She has an appointment with a psychiatrist at [facility] on Wednesday. She is glad to be at [facility] and feels supported by the staff.    Zeinab was also started on OCPs a month and a half ago for amenorrhea due to PCOS. Since beginning the OCPs, she has had one menstrual period. She said it was of fairly heavy flow and lasted 7 days. She has not had any bleeding since then (3 weeks). She feels that the treatment is helping.    +++++++    Problem, Medication and Allergy Lists were reviewed and updated if needed..    Patient is an established patient of this clinic..         Review of Systems:   Review of Systems   Constitutional: Negative for fever.   Respiratory: Negative for shortness of breath.    Cardiovascular: Negative for chest pain.   Neurological: Negative for headaches.   Psychiatric/Behavioral: Positive for dysphoric mood. Negative for self-injury, sleep disturbance and suicidal ideas.            Physical Exam:     Vitals:    12/10/18 1528   BP: 124/79   Pulse: 83   Temp: 99  F (37.2  C)   TempSrc: Oral   SpO2: 99%   Weight: 122.2 kg (269 lb 6.4 oz)     Body mass index is 43.15  kg/m .  Vitals were reviewed and were normal     Physical Exam   Constitutional: She appears well-developed and well-nourished. No distress.   Obese female   HENT:   Head: Normocephalic and atraumatic.   Eyes: Conjunctivae and EOM are normal. No scleral icterus.   Cardiovascular: Normal rate, regular rhythm and normal heart sounds.   Pulmonary/Chest: Effort normal and breath sounds normal. No respiratory distress.   Neurological: She is alert.   Skin: She is not diaphoretic.         Results:   No testing ordered today    Assessment and Plan        1. PCOS (polycystic ovarian syndrome)  OCPs seem to be working well so far. Continue taking and follow-up in 3 months. We advised about diet and exercise. Consider metformin in future for metabolic benefits.    2. Moderate episode of recurrent major depressive disorder (H)  It seems that Zeinab's current medication regimen has not improved her mood. She is in a stable living situation at [facility] and denies current SI. Due to the complexity of Zeinab's current psychiatric regimen (including two antipsychotics), we will defer to her psychiatrist with whom she will be meeting in 2 days.        There are no discontinued medications.    Options for treatment and follow-up care were reviewed with the patient. Zeinab ESTEPHANIA Terrazas  engaged in the decision making process and verbalized understanding of the options discussed and agreed with the final plan.    Judd Collier, MS3    I was present with the medical student who participated in the service and in the documentation of this note. I have verified the history and personally performed the physical exam and medical decision making, and have verified the content of the note, which accurately reflects my assessment of the patient and the plan of care.     Shila Quesada DO  [facility] PGY3

## 2018-12-11 ASSESSMENT — ANXIETY QUESTIONNAIRES: GAD7 TOTAL SCORE: 15

## 2019-04-01 ENCOUNTER — OFFICE VISIT (OUTPATIENT)
Dept: FAMILY MEDICINE | Facility: CLINIC | Age: 19
End: 2019-04-01
Payer: COMMERCIAL

## 2019-04-01 VITALS
SYSTOLIC BLOOD PRESSURE: 119 MMHG | DIASTOLIC BLOOD PRESSURE: 83 MMHG | TEMPERATURE: 98.7 F | WEIGHT: 286.2 LBS | HEART RATE: 87 BPM | OXYGEN SATURATION: 97 % | BODY MASS INDEX: 45.85 KG/M2

## 2019-04-01 DIAGNOSIS — J02.9 SORE THROAT: Primary | ICD-10-CM

## 2019-04-01 DIAGNOSIS — B08.5 ACUTE HERPANGINA: ICD-10-CM

## 2019-04-01 DIAGNOSIS — J02.9 PHARYNGITIS, UNSPECIFIED ETIOLOGY: ICD-10-CM

## 2019-04-01 LAB — S PYO AG THROAT QL IA.RAPID: NEGATIVE

## 2019-04-01 NOTE — PROGRESS NOTES
"       HPI       Zeinab Terrazas is a 18 year old  who presents for   Chief Complaint   Patient presents with     Cough     sore throat 5 days, no fever, mucus     Zeinab presents today due to 5 days of sore throat with accompanying cough, congestion, rhinorrhea and pain with swallowing. She also noticed \"blisteres\" at the back of her throat. She feels more pain on the right side of her throat. 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 Due to this, she takes azithromycin every other day. She has had multiple episodes of sore throat (last episode about 5 months ago). She reports that when she gets sick, she takes the azithromycin everyday until her symptoms resolve. She has not taken it for a couple of months. Her immunologist started her on azithromycin today but told her to come to the clinic for a different antibiotic.         Problem, Medication and Allergy Lists were reviewed and updated if needed..    Patient is an established patient of this clinic..         Review of Systems:   Review of Systems   Constitutional: Negative for chills and fever.   HENT: Positive for rhinorrhea and sore throat.    Respiratory: Positive for cough. Negative for shortness of breath.    Cardiovascular: Negative for chest pain.   Gastrointestinal: Negative for abdominal pain, constipation, diarrhea, nausea and vomiting.   Genitourinary: Negative for dysuria.   Musculoskeletal: Negative for arthralgias and myalgias.   Neurological: Negative for light-headedness and headaches.    is negative unless noted above.          Physical Exam:     Vitals:    04/01/19 1627   BP: 119/83   Pulse: 87   Temp: 98.7  F (37.1  C)   TempSrc: Oral   SpO2: 97%   Weight: 129.8 kg (286 lb 3.2 oz)     Body mass index is 45.85 kg/m .  Vitals were reviewed and were normal     Physical Exam   Constitutional: She is oriented to person, place, and time. She appears well-developed and well-nourished. No " distress.   HENT:   Head: Normocephalic.   No lymphadenopathy appreciated. TMs clear. Unilateral (right) posterior pharyngeal hyperemia, with two aphthous ulcers. Painful on swab. None on the left side. No fluctuance.  No visible tonsils.  No exudate   Eyes: Conjunctivae are normal.   Neck: Neck supple.   Cardiovascular: Normal rate.   Pulmonary/Chest: Effort normal. No respiratory distress.   Musculoskeletal: She exhibits no deformity.   Neurological: She is alert and oriented to person, place, and time.   Skin: Skin is warm and dry.   Psychiatric: She has a normal mood and affect.       Results:   Results are ordered and pending  Rapid strep - negative, culture pending  Assessment and Plan    Zeinab was seen today for 5 days of cough and sore throat. Differential includes strep throat vs. Hand foot and mouth disease. Given this has been going on for 5 days and her previous history of multiple sore throat, strep was high on the differential however rapid strep was negative. This is therefore most likely hand food and mouth disease (Herpangina with only oral lesions) given the ulcerated nature of the lesions and the lack of exudate.     Hand food and mouth disease/Herpangina  - patient counseled on appropriate hand washing and care amongst residents at [facility].   - prescribes viscous lidocaine for pain management.   - Patient can continue azithromycin, no additional antibiotics.     Sore throat  -     Strep Screen Rapid (Group) ([facility])  -     Strep Culture ([facility])      There are no discontinued medications.    Options for treatment and follow-up care were reviewed with the patient. Zeinab BEST Mk  engaged in the decision making process and verbalized understanding of the options discussed and agreed with the final plan.    Mallory Quiñones, MS3      I was present with the medical student who participated in the service and in the documentation of this note. I have verified the history and personally  performed the physical exam and medical decision making, and have verified the content of the note, which accurately reflects my assessment of the patient and the plan of care.   MD Carlos Cespedes MD  Chief Resident  [facility]  [phone]

## 2019-04-01 NOTE — PATIENT INSTRUCTIONS
Here is the plan from today's visit    1. Sore throat  2. Pharyngitis, unspecified etiology  3. Acute herpangina  - Strep Screen Rapid (Group) ([facility])  - Strep Culture ([facility]AB[facility])  - lidocaine VISCOUS (XYLOCAINE) 2 % solution; Take 15 mLs by mouth every 2 hours as needed for moderate pain swish and spit every 3-8 hours as needed; max 8 doses/24 hour period  Dispense: 100 mL; Refill: 1  - lidocaine VISCOUS (XYLOCAINE) 2 % solution; Take 15 mLs by mouth every 2 hours as needed for moderate pain swish and spit every 3-8 hours as needed; max 8 doses/24 hour period  Dispense: 100 mL; Refill: 1      Please call or return to clinic if your symptoms don't go away.    Follow up plan  Please make a clinic appointment for follow up with me (MICHELET VAZQUEZ) as needed  Thank you for coming to [facility] today.  Lab Testing:  **If you had lab testing today and your results are reassuring or normal they will be mailed to you or sent through [facility] within 7 days.   **If the lab tests need quick action we will call you with the results.  The phone number we will call with results is # [phone] (home) . If this is not the best number please call our clinic and change the number.  Medication Refills:  If you need any refills please call your pharmacy and they will contact us.   If you need to  your refill at a new pharmacy, please contact the new pharmacy directly. The new pharmacy will help you get your medications transferred faster.   Scheduling:  If you have any concerns about today's visit or wish to schedule another appointment please call our office during normal business hours [phone] (8-5:00 M-F)  If a referral was made to a [facility] and you don't get a call from central scheduling please call [phone].  If a Mammogram was ordered for you at [facility] call [phone] to schedule or change your appointment.  If you had an XRay/CT/Ultrasound/MRI ordered the  number is [phone] to schedule or change your radiology appointment.   Medical Concerns:  If you have urgent medical concerns please call [phone] at any time of the day.    Carlos Zuniga MD

## 2019-04-02 ASSESSMENT — ENCOUNTER SYMPTOMS
SHORTNESS OF BREATH: 0
NAUSEA: 0
SORE THROAT: 1
RHINORRHEA: 1
HEADACHES: 0
ARTHRALGIAS: 0
CHILLS: 0
VOMITING: 0
DIARRHEA: 0
COUGH: 1
DYSURIA: 0
CONSTIPATION: 0
FEVER: 0
MYALGIAS: 0
LIGHT-HEADEDNESS: 0
ABDOMINAL PAIN: 0

## 2019-04-03 LAB
BACTERIA SPEC CULT: NORMAL
Lab: NORMAL
SPECIMEN SOURCE: NORMAL

## 2019-04-30 ENCOUNTER — TELEPHONE (OUTPATIENT)
Dept: FAMILY MEDICINE | Facility: CLINIC | Age: 19
End: 2019-04-30

## 2019-04-30 DIAGNOSIS — E28.2 PCOS (POLYCYSTIC OVARIAN SYNDROME): Primary | ICD-10-CM

## 2019-04-30 RX ORDER — LEVONORGESTREL AND ETHINYL ESTRADIOL 0.15-0.03
1 KIT ORAL DAILY
Qty: 90 TABLET | Refills: 2 | Status: SHIPPED | OUTPATIENT
Start: 2019-04-30

## 2019-04-30 NOTE — TELEPHONE ENCOUNTER
Verify that the refill encounter hasn't been started Yes    [facility] phone call message- patient requesting a refill:    Full Medication Name:levonorgestrel-ethinyl estradiol (NORDETTE) 0.15-30 MG-MCG per tablet     Dose: Route: Take 1 tablet by mouth daily - Oral     Pharmacy confirmed as   [facility] [place] [facility] - [place], [place] - [address]  [address]  [place] [address]  Phone: [phone] Fax: [phone]  : Yes    Medication tab checked to see if medication has been sent  Yes    Additional Comments: Patient is out of medication.     OK to leave a message on voice mail? Yes    Advised patient refill may take up to 2 business days? Yes    Primary language: English      needed? No    Call taken on April 30, 2019 at 12:10 PM by Sheree Jimenez    Route to P [facility] MED REFILL

## 2019-04-30 NOTE — TELEPHONE ENCOUNTER
"Request for medication refill: BILLY) 0.15-30 MG-MCG.     Providers if patient needs an appointment and you are willing to give a one month supply please refill for one month and  send a letter/MyChart using \".SMILLIMITEDREFILL\" .smillimited and route chart to \"[facility] \" (Giving one month refill in non controlled medications is strongly recommended before denial)    If refill has been denied, meaning absolutely no refills without visit, please complete the smart phrase \".smirxrefuse\" and route it to the \"[facility] MED REFILLS\"  pool to inform the patient and the pharmacy.    Tyrone Tirado RN        "

## 2020-09-22 ENCOUNTER — AMBULATORY - HEALTHEAST (OUTPATIENT)
Dept: INTERNAL MEDICINE | Facility: CLINIC | Age: 20
End: 2020-09-22

## 2020-09-22 ENCOUNTER — AMBULATORY - HEALTHEAST (OUTPATIENT)
Dept: FAMILY MEDICINE | Facility: CLINIC | Age: 20
End: 2020-09-22

## 2020-09-22 ENCOUNTER — VIRTUAL VISIT (OUTPATIENT)
Dept: FAMILY MEDICINE | Facility: OTHER | Age: 20
End: 2020-09-22

## 2020-09-22 DIAGNOSIS — Z20.822 SUSPECTED 2019 NOVEL CORONAVIRUS INFECTION: ICD-10-CM

## 2020-09-22 NOTE — PROGRESS NOTES
"Date: 2020 09:09:51  Clinician: Jeyson Gerber  Clinician NPI: [id number]  Patient: Zeinab Terrazas  Patient : 2000  Patient Address: [address] [place] [address] [address] , [place] [address]  Patient Phone: [phone]  Visit Protocol: URI  Patient Summary:  Zeinab is a 19 year old ( : 2000 ) female who initiated a [facility] Visit for COVID-19 (Coronavirus) evaluation and screening. When asked the question \"Please sign me up to receive news, health information and promotions. \", Zeinab responded \"No\".    Zeinab states her symptoms started gradually 5-6 days ago. After her symptoms started, they improved and then got worse again.   Her symptoms consist of chills, malaise, facial pain or pressure, a sore throat, a cough, nasal congestion, a headache, ear pain, anosmia, rhinitis, nausea, enlarged lymph nodes, and myalgia. She is experiencing mild difficulty breathing with activities but can speak normally in full sentences. Zeinab also feels feverish but was unable to measure her temperature.   Symptom details     Nasal secretions: The color of her mucus is yellow, blood-tinged, and green.    Cough: Zeinab coughs every 5-10 minutes and her cough is not more bothersome at night. Phlegm does not come into her throat when she coughs. She does not believe her cough is caused by post-nasal drip.     Sore throat: Zeinab reports having moderate throat pain (4-6 on a 10 point pain scale), has exudate on her tonsils, and can swallow liquids. The lymph nodes in her neck are enlarged. A rash has appeared on the skin since the sore throat started. Zeinab uploaded photos of her rash (see below).     Facial pain or pressure: The facial pain or pressure feels worse when bending over or leaning forward.     Headache: She states the headache is moderate (4-6 on a 10 point pain scale).      Zeinab denies having teeth pain, ageusia, diarrhea, vomiting, and wheezing. She also denies taking antibiotic medication in the past month and " having recent facial or sinus surgery in the past 60 days.   Precipitating events  Zeinab is not sure if she has been exposed to someone with strep throat. She has not recently been exposed to someone with influenza. Zeinab has been in close contact with the following high risk individuals: immunocompromised people.   Pertinent COVID-19 (Coronavirus) information  In the past 14 days, Zeinab has not worked in a congregate living setting.   She does not work or volunteer as healthcare worker or a  and does not work or volunteer in a healthcare facility.   Zeinab also has not lived in a congregate living setting in the past 14 days. She lives with a healthcare worker.   Zeinab has not had a close contact with a laboratory-confirmed COVID-19 patient within 14 days of symptom onset.   Since December 2019, Zeinab and has not had upper respiratory infection or influenza-like illness. Has not been diagnosed with lab-confirmed COVID-19 test   Pertinent medical history  Zeinab had 1 sinus infection within the past year.   Zeinab does not get yeast infections when she takes antibiotics.   Zeinab does not need a return to work/school note.   Weight: 260 lbs   Zeinab does not smoke or use smokeless tobacco.   She is not sure if she is pregnant and denies breastfeeding. She does not menstruate.   Weight: 260 lbs    MEDICATIONS: No current medications, ALLERGIES: NKDA  Clinician Response:  Dear Zeinab,   Your symptoms show that you may have coronavirus (COVID-19). This illness can cause fever, cough and trouble breathing. Many people get a mild case and get better on their own. Some people can get very sick.  What should I do?  We would like to test you for this virus.   1. Please call [phone] to schedule your visit. Explain that you were referred by [facility] to have a COVID-19 test. Be ready to share your [facility] visit ID number.  The following will serve as your written order for this COVID Test, ordered by me,  "for the indication of suspected COVID [Z20.828]: The test will be ordered in [employer], our electronic health record, after you are scheduled. It will show as ordered and authorized by Francisco Javier Chawla MD.  Order: COVID-19 (Coronavirus) PCR [facility]OM[facility]IC [facility].      2. When it's time for your COVID test:  Stay at least 6 feet away from others. (If someone will drive you to your test, stay in the backseat, as far away from the  as you can.)   Cover your mouth and nose with a mask, tissue or washcloth.  Go straight to the testing site. Don't make any stops on the way there or back.      3.Starting now: Stay home and away from others (self-isolate) until:   You've had no fever---and no medicine that reduces fever---for one full day (24 hours). And...   Your other symptoms have gotten better. For example, your cough or breathing has improved. And...   At least 10 days have passed since your symptoms started.       During this time, don't leave the house except for testing or medical care.   Stay in your own room, even for meals. Use your own bathroom if you can.   Stay away from others in your home. No hugging, kissing or shaking hands. No visitors.  Don't go to work, school or anywhere else.    Clean \"high touch\" surfaces often (doorknobs, counters, handles, etc.). Use a household cleaning spray or wipes. You'll find a full list of  on the EPA website: www.epa.gov/pesticide-registration/list-n-disinfectants-use-against-sars-cov-2.   Cover your mouth and nose with a mask, tissue or washcloth to avoid spreading germs.  Wash your hands and face often. Use soap and water.  Caregivers in these groups are at risk for severe illness due to COVID-19:  o People 65 years and older  o People who live in a nursing home or long-term care facility  o People with chronic disease (lung, heart, cancer, diabetes, kidney, liver, immunologic)  o People who have a weakened immune system, including those who:   Are in " cancer treatment  Take medicine that weakens the immune system, such as corticosteroids  Had a bone marrow or organ transplant  Have an immune deficiency  Have poorly controlled HIV or AIDS  Are obese (body mass index of 40 or higher)  Smoke regularly   o Caregivers should wear gloves while washing dishes, handling laundry and cleaning bedrooms and bathrooms.  o Use caution when washing and drying laundry: Don't shake dirty laundry, and use the warmest water setting that you can.  o For more tips, go to www.cdc.gov/coronavirus/2019-ncov/downloads/10Things.pdf.    4.Sign up for [employer]. We know it's scary to hear that you might have COVID-19. We want to track your symptoms to make sure you're okay over the next 2 weeks. Please look for an email from [employer]---this is a free, online program that we'll use to keep in touch. To sign up, follow the link in the email. Learn more at [email or web address]  How can I take care of myself?   Get lots of rest. Drink extra fluids (unless a doctor has told you not to).   Take Tylenol (acetaminophen) for fever or pain. If you have liver or kidney problems, ask your family doctor if it's okay to take Tylenol.   Adults can take either:    650 mg (two 325 mg pills) every 4 to 6 hours, or...   1,000 mg (two 500 mg pills) every 8 hours as needed.    Note: Don't take more than 3,000 mg in one day. Acetaminophen is found in many medicines (both prescribed and over-the-counter medicines). Read all labels to be sure you don't take too much.   For children, check the Tylenol bottle for the right dose. The dose is based on the child's age or weight.    If you have other health problems (like cancer, heart failure, an organ transplant or severe kidney disease): Call your specialty clinic if you don't feel better in the next 2 days.       Know when to call 911. Emergency warning signs include:    Trouble breathing or shortness of breath Pain or pressure in the chest that  doesn't go away Feeling confused like you haven't felt before, or not being able to wake up Bluish-colored lips or face.  Where can I get more information?   [facility] -- About COVID-19: [email or web address]   CDC -- What to Do If You're Sick: www.cdc.gov/coronavirus/2019-ncov/about/steps-when-sick.html   CDC -- Ending Home Isolation: www.cdc.gov/coronavirus/2019-ncov/hcp/disposition-in-home-patients.html   [facility] -- Caring for Someone: www.cdc.gov/coronavirus/2019-ncov/if-you-are-sick/care-for-someone.html   [facility] -- Interim Guidance for Hospital Discharge to Home: www.[facility].[place]/diseases/coronavirus/hcp/hospdischarge.pdf   [facility] clinical trials (COVID-19 research studies): clinicalaffairs.[facility]-clinical-trials    Below are the COVID-19 hotlines at the [facility] ([facility]). Interpreters are available.    For health questions: Call 648-189-7449 or 1-523.360.3554 (7 a.m. to 7 p.m.) For questions about schools and childcare: Call 993-041-8693 or 1-822.227.2841 (7 a.m. to 7 p.m.)    Diagnosis: Cough  Diagnosis ICD: R05

## 2020-09-24 ENCOUNTER — COMMUNICATION - HEALTHEAST (OUTPATIENT)
Dept: SCHEDULING | Facility: CLINIC | Age: 20
End: 2020-09-24

## 2020-12-03 ENCOUNTER — RECORDS - HEALTHEAST (OUTPATIENT)
Dept: ADMINISTRATIVE | Facility: OTHER | Age: 20
End: 2020-12-03

## 2021-06-05 VITALS — BODY MASS INDEX: 38.77 KG/M2 | WEIGHT: 242 LBS

## 2021-08-08 ENCOUNTER — HEALTH MAINTENANCE LETTER (OUTPATIENT)
Age: 21
End: 2021-08-08

## 2021-10-03 ENCOUNTER — HEALTH MAINTENANCE LETTER (OUTPATIENT)
Age: 21
End: 2021-10-03

## 2022-09-10 ENCOUNTER — HEALTH MAINTENANCE LETTER (OUTPATIENT)
Age: 22
End: 2022-09-10

## 2023-10-01 ENCOUNTER — HEALTH MAINTENANCE LETTER (OUTPATIENT)
Age: 23
End: 2023-10-01